# Patient Record
Sex: FEMALE | Race: BLACK OR AFRICAN AMERICAN | URBAN - METROPOLITAN AREA
[De-identification: names, ages, dates, MRNs, and addresses within clinical notes are randomized per-mention and may not be internally consistent; named-entity substitution may affect disease eponyms.]

---

## 2018-11-01 ENCOUNTER — OFFICE VISIT (OUTPATIENT)
Dept: URBAN - METROPOLITAN AREA CLINIC 17 | Facility: CLINIC | Age: 73
End: 2018-11-01
Payer: MEDICARE

## 2018-11-01 DIAGNOSIS — H52.4 PRESBYOPIA: ICD-10-CM

## 2018-11-01 DIAGNOSIS — D86.89 SARCOIDOSIS OF OTHER SITES: ICD-10-CM

## 2018-11-01 DIAGNOSIS — H31.092 OTHER CHORIORETINAL SCARS, LEFT EYE: ICD-10-CM

## 2018-11-01 PROCEDURE — 92014 COMPRE OPH EXAM EST PT 1/>: CPT | Performed by: OPTOMETRIST

## 2018-11-01 ASSESSMENT — INTRAOCULAR PRESSURE
OD: 17
OS: 14

## 2018-11-01 ASSESSMENT — VISUAL ACUITY
OD: 20/30
OS: CF 1FT

## 2018-11-01 NOTE — IMPRESSION/PLAN
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 Vision affected Plan: No treatment is required at this time. Will continue to observe condition and or symptoms. Monocular precautions to protect OD.

## 2018-11-01 NOTE — IMPRESSION/PLAN
Impression: Other secondary cataract, left eye: H26.492. Plan: No treatment currently recommended. The patient will monitor vision changes and contact us with any decrease in vision.

## 2018-11-01 NOTE — IMPRESSION/PLAN
Impression: Combined forms of age-related cataract, right eye: H25.811. Plan: No treatment currently recommended. The patient will monitor vision changes and contact us with any decrease in vision.

## 2018-11-01 NOTE — IMPRESSION/PLAN
Impression: Diagnosis: Sarcoidosis of other sites. Code: D86.89. Previously seen by retina in 2012, caused macula scars OS. Plan: Discussed diagnosis in detail with patient. No treatment is required at this time. Will continue to observe condition and or symptoms. Monocular precautions to protect OD.

## 2019-05-28 ENCOUNTER — OFFICE VISIT (OUTPATIENT)
Dept: URBAN - METROPOLITAN AREA CLINIC 17 | Facility: CLINIC | Age: 74
End: 2019-05-28
Payer: MEDICARE

## 2019-05-28 DIAGNOSIS — H35.363 DRUSEN (DEGENERATIVE) OF MACULA, BILATERAL: ICD-10-CM

## 2019-05-28 DIAGNOSIS — H43.812 VITREOUS DEGENERATION, LEFT EYE: ICD-10-CM

## 2019-05-28 DIAGNOSIS — H26.492 OTHER SECONDARY CATARACT, LEFT EYE: ICD-10-CM

## 2019-05-28 DIAGNOSIS — H11.153 PINGUECULA, BILATERAL: ICD-10-CM

## 2019-05-28 DIAGNOSIS — H18.413 ARCUS SENILIS, BILATERAL: ICD-10-CM

## 2019-05-28 PROCEDURE — 92014 COMPRE OPH EXAM EST PT 1/>: CPT | Performed by: OPTOMETRIST

## 2019-05-28 ASSESSMENT — INTRAOCULAR PRESSURE
OS: 10
OD: 10

## 2019-05-28 NOTE — IMPRESSION/PLAN
Impression: Vitreous degeneration, left eye: H43.812. Plan: There is no evidence of retinal pathology. All signs, symptoms, and risks of retinal detachment and tears were discussed in detail. Patient instructed to call the office immediately if any symptoms noted. Recommend the patient return to office yearly for follow up.

## 2019-05-28 NOTE — IMPRESSION/PLAN
Impression: Arcus senilis, bilateral: H18.413. Plan: No treatment is required at this time. Will continue to observe condition and or symptoms.

## 2019-05-28 NOTE — IMPRESSION/PLAN
Impression: Other secondary cataract, left eye: H26.492. Plan: Discussed diagnosis in detail with patient. Discussed treatment options with patient. Recommend YAG eval. Patient understands VA may not improve. Procedure may be done in order to see the health of the eye.

## 2019-05-28 NOTE — IMPRESSION/PLAN
Impression: Combined forms of age-related cataract, right eye: H25.811. Plan: Discussed diagnosis with patient. Cataract evaluation is recommended. Will refer to Dr. Pham Hyde for CAT Eval. Pt wishes to proceed.

## 2019-06-14 ENCOUNTER — OFFICE VISIT (OUTPATIENT)
Dept: URBAN - METROPOLITAN AREA CLINIC 17 | Facility: CLINIC | Age: 74
End: 2019-06-14
Payer: MEDICARE

## 2019-06-14 DIAGNOSIS — H25.811 COMBINED FORMS OF AGE-RELATED CATARACT, RIGHT EYE: ICD-10-CM

## 2019-06-14 PROCEDURE — 99203 OFFICE O/P NEW LOW 30 MIN: CPT | Performed by: OPHTHALMOLOGY

## 2019-06-14 RX ORDER — PREDNISOLONE ACETATE 10 MG/ML
1 % SUSPENSION/ DROPS OPHTHALMIC
Qty: 1 | Refills: 1 | Status: INACTIVE
Start: 2019-06-14 | End: 2019-08-27

## 2019-06-14 RX ORDER — OFLOXACIN 3 MG/ML
0.3 % SOLUTION/ DROPS OPHTHALMIC
Qty: 1 | Refills: 1 | Status: INACTIVE
Start: 2019-06-14 | End: 2019-08-27

## 2019-06-14 ASSESSMENT — INTRAOCULAR PRESSURE
OS: 10
OD: 12

## 2019-06-14 ASSESSMENT — VISUAL ACUITY: OD: 20/30

## 2019-06-14 NOTE — IMPRESSION/PLAN
Impression: Other secondary cataract, left eye: H26.492. Vision: vision affected. Symptoms: could improve with surgery. Plan: Discussed diagnosis with patient. Recommend YAG OS laser treatment. Patient understands and wishes to proceed.

## 2019-06-14 NOTE — IMPRESSION/PLAN
Impression: Combined forms of age-related cataract, right eye: H25.811. Vision: vision affected. Symptoms: could improve with surgery. Plan: Cataract accounts for patient's complaints. Reviewed risks, benefits, and procedure. Patient desires surgery, schedule ce/iol OD, RL2, discussed lens options, distance refractive target, patient is clear for surgery in [facility], [place] [address].

## 2019-06-27 ENCOUNTER — SURGERY (OUTPATIENT)
Dept: URBAN - METROPOLITAN AREA SURGERY 7 | Facility: SURGERY | Age: 74
End: 2019-06-27
Payer: MEDICARE

## 2019-06-27 PROCEDURE — 66821 AFTER CATARACT LASER SURGERY: CPT | Performed by: OPHTHALMOLOGY

## 2019-07-03 ENCOUNTER — POST-OPERATIVE VISIT (OUTPATIENT)
Dept: URBAN - METROPOLITAN AREA CLINIC 17 | Facility: CLINIC | Age: 74
End: 2019-07-03

## 2019-07-03 PROCEDURE — 99024 POSTOP FOLLOW-UP VISIT: CPT | Performed by: OPTOMETRIST

## 2019-07-03 RX ORDER — ESOMEPRAZOLE MAGNESIUM 40 MG/1
40 MG CAPSULE, DELAYED RELEASE ORAL
Refills: 0 | Status: ACTIVE
Start: 2019-07-03

## 2019-07-03 ASSESSMENT — VISUAL ACUITY: OS: CF

## 2019-07-03 ASSESSMENT — INTRAOCULAR PRESSURE
OD: 10
OS: 9

## 2019-07-12 ENCOUNTER — PRE-OPERATIVE VISIT (OUTPATIENT)
Dept: URBAN - METROPOLITAN AREA CLINIC 17 | Facility: CLINIC | Age: 74
End: 2019-07-12
Payer: MEDICARE

## 2019-07-12 ASSESSMENT — PACHYMETRY
OS: 24.09
OS: 5.14
OD: 24.23
OD: 2.83

## 2019-07-22 ENCOUNTER — SURGERY (OUTPATIENT)
Dept: URBAN - METROPOLITAN AREA SURGERY 7 | Facility: SURGERY | Age: 74
End: 2019-07-22
Payer: MEDICARE

## 2019-07-22 PROCEDURE — 66984 XCAPSL CTRC RMVL W/O ECP: CPT | Performed by: OPHTHALMOLOGY

## 2019-07-23 ENCOUNTER — POST-OPERATIVE VISIT (OUTPATIENT)
Dept: URBAN - METROPOLITAN AREA CLINIC 17 | Facility: CLINIC | Age: 74
End: 2019-07-23

## 2019-07-23 DIAGNOSIS — Z09 ENCNTR FOR F/U EXAM AFT TRTMT FOR COND OTH THAN MALIG NEOPLM: Primary | ICD-10-CM

## 2019-07-23 PROCEDURE — 99024 POSTOP FOLLOW-UP VISIT: CPT | Performed by: OPTOMETRIST

## 2019-07-23 ASSESSMENT — INTRAOCULAR PRESSURE
OS: 17
OD: 16

## 2019-07-30 ENCOUNTER — POST-OPERATIVE VISIT (OUTPATIENT)
Dept: URBAN - METROPOLITAN AREA CLINIC 17 | Facility: CLINIC | Age: 74
End: 2019-07-30

## 2019-07-30 PROCEDURE — 99024 POSTOP FOLLOW-UP VISIT: CPT | Performed by: OPTOMETRIST

## 2019-07-30 ASSESSMENT — INTRAOCULAR PRESSURE
OS: 11
OD: 13

## 2019-07-30 ASSESSMENT — VISUAL ACUITY: OD: 20/40

## 2019-08-27 ENCOUNTER — POST-OPERATIVE VISIT (OUTPATIENT)
Dept: URBAN - METROPOLITAN AREA CLINIC 17 | Facility: CLINIC | Age: 74
End: 2019-08-27

## 2019-08-27 PROCEDURE — 99024 POSTOP FOLLOW-UP VISIT: CPT | Performed by: OPTOMETRIST

## 2019-08-27 ASSESSMENT — INTRAOCULAR PRESSURE
OD: 18
OS: 18

## 2019-08-27 ASSESSMENT — VISUAL ACUITY: OD: 20/25

## 2019-11-19 ENCOUNTER — OFFICE VISIT (OUTPATIENT)
Dept: URBAN - METROPOLITAN AREA CLINIC 17 | Facility: CLINIC | Age: 74
End: 2019-11-19
Payer: MEDICARE

## 2019-11-19 DIAGNOSIS — H26.491 OTHER SECONDARY CATARACT, RIGHT EYE: ICD-10-CM

## 2019-11-19 DIAGNOSIS — Z96.1 PRESENCE OF INTRAOCULAR LENS: Primary | ICD-10-CM

## 2019-11-19 DIAGNOSIS — H35.3131 NONEXUDATIVE AGE-RELATED MACULAR DEGENERATION, BILATERAL, EARLY DRY STAGE: ICD-10-CM

## 2019-11-19 PROCEDURE — 99214 OFFICE O/P EST MOD 30 MIN: CPT | Performed by: OPTOMETRIST

## 2019-11-19 ASSESSMENT — INTRAOCULAR PRESSURE
OS: 12
OD: 12

## 2019-11-19 NOTE — IMPRESSION/PLAN
Impression: Other secondary cataract, right eye: H26.491. Plan: No treatment is required at this time. Will continue to observe condition and or symptoms.

## 2019-11-19 NOTE — IMPRESSION/PLAN
Impression: Non-exudative age-related macular degeneration, bilateral, early dry stage: H35.3131. Plan: Discussed diagnosis in detail with patient. Advised patient of condition. Discussed risks and benefits and patient understands. No treatment is required at this time. Will continue to observe condition and or symptoms.

## 2021-01-15 DIAGNOSIS — Z23 NEED FOR VACCINATION: ICD-10-CM
